# Patient Record
Sex: FEMALE | Race: WHITE | NOT HISPANIC OR LATINO | URBAN - METROPOLITAN AREA
[De-identification: names, ages, dates, MRNs, and addresses within clinical notes are randomized per-mention and may not be internally consistent; named-entity substitution may affect disease eponyms.]

---
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
## 2023-09-07 ENCOUNTER — TELEPHONE (OUTPATIENT)
Dept: ORTHOPEDIC SURGERY | Facility: CLINIC | Age: 18
End: 2023-09-07
Payer: COMMERCIAL

## 2023-09-07 NOTE — TELEPHONE ENCOUNTER
LEFT ANKLE XRAY 9-6. PATIENT HAD PREVIOUS SURGERY ON THE ANKLE-DO NOT HAVE OP NOTE, BUT [facility] NOTES MENTION DR CARRERA, DOES PATIENT NEED TO RETURN THERE OR WILL DR BURCIAGA SEE?

## 2024-03-22 ENCOUNTER — OFFICE VISIT (OUTPATIENT)
Dept: FAMILY MEDICINE CLINIC | Facility: CLINIC | Age: 19
End: 2024-03-22
Payer: COMMERCIAL

## 2024-03-22 VITALS
DIASTOLIC BLOOD PRESSURE: 62 MMHG | OXYGEN SATURATION: 100 % | BODY MASS INDEX: 22.1 KG/M2 | TEMPERATURE: 97.9 F | HEIGHT: 66 IN | SYSTOLIC BLOOD PRESSURE: 102 MMHG | HEART RATE: 101 BPM | WEIGHT: 137.5 LBS

## 2024-03-22 DIAGNOSIS — R55 SYNCOPE, UNSPECIFIED SYNCOPE TYPE: ICD-10-CM

## 2024-03-22 DIAGNOSIS — Z00.00 ANNUAL PHYSICAL EXAM: ICD-10-CM

## 2024-03-22 DIAGNOSIS — N92.0 MENORRHAGIA WITH REGULAR CYCLE: ICD-10-CM

## 2024-03-22 DIAGNOSIS — G47.00 INSOMNIA, UNSPECIFIED TYPE: ICD-10-CM

## 2024-03-22 DIAGNOSIS — Z83.2 FAMILY HISTORY OF BLEEDING DISORDER: ICD-10-CM

## 2024-03-22 DIAGNOSIS — Z00.00 ENCOUNTER FOR MEDICAL EXAMINATION TO ESTABLISH CARE: Primary | ICD-10-CM

## 2024-03-22 LAB
ALBUMIN SERPL-MCNC: 4.9 G/DL (ref 3.5–5.2)
ALBUMIN/GLOB SERPL: 1.8 G/DL
ALP SERPL-CCNC: 54 U/L (ref 39–117)
ALT SERPL W P-5'-P-CCNC: 13 U/L (ref 1–33)
ANION GAP SERPL CALCULATED.3IONS-SCNC: 10.7 MMOL/L (ref 5–15)
AST SERPL-CCNC: 17 U/L (ref 1–32)
BASOPHILS # BLD AUTO: 0.02 10*3/MM3 (ref 0–0.2)
BASOPHILS NFR BLD AUTO: 0.3 % (ref 0–1.5)
BILIRUB SERPL-MCNC: 0.3 MG/DL (ref 0–1.2)
BILIRUB UR QL STRIP: NEGATIVE
BUN SERPL-MCNC: 7 MG/DL (ref 6–20)
BUN/CREAT SERPL: 8 (ref 7–25)
CALCIUM SPEC-SCNC: 9.5 MG/DL (ref 8.6–10.5)
CHLORIDE SERPL-SCNC: 104 MMOL/L (ref 98–107)
CHOLEST SERPL-MCNC: 123 MG/DL (ref 0–200)
CLARITY UR: CLEAR
CO2 SERPL-SCNC: 23.3 MMOL/L (ref 22–29)
COLOR UR: YELLOW
CREAT SERPL-MCNC: 0.88 MG/DL (ref 0.57–1)
DEPRECATED RDW RBC AUTO: 41.8 FL (ref 37–54)
EGFRCR SERPLBLD CKD-EPI 2021: 96 ML/MIN/1.73
EOSINOPHIL # BLD AUTO: 0.05 10*3/MM3 (ref 0–0.4)
EOSINOPHIL NFR BLD AUTO: 0.8 % (ref 0.3–6.2)
ERYTHROCYTE [DISTWIDTH] IN BLOOD BY AUTOMATED COUNT: 12.8 % (ref 12.3–15.4)
FERRITIN SERPL-MCNC: 81.1 NG/ML (ref 13–150)
GLOBULIN UR ELPH-MCNC: 2.8 GM/DL
GLUCOSE SERPL-MCNC: 89 MG/DL (ref 65–99)
GLUCOSE UR STRIP-MCNC: NEGATIVE MG/DL
HCT VFR BLD AUTO: 42 % (ref 34–46.6)
HDLC SERPL-MCNC: 56 MG/DL (ref 40–60)
HGB BLD-MCNC: 13.7 G/DL (ref 12–15.9)
HGB UR QL STRIP.AUTO: NEGATIVE
HOLD SPECIMEN: NORMAL
IMM GRANULOCYTES # BLD AUTO: 0.02 10*3/MM3 (ref 0–0.05)
IMM GRANULOCYTES NFR BLD AUTO: 0.3 % (ref 0–0.5)
IRON 24H UR-MRATE: 30 MCG/DL (ref 37–145)
IRON SATN MFR SERPL: 8 % (ref 20–50)
KETONES UR QL STRIP: NEGATIVE
LDLC SERPL CALC-MCNC: 57 MG/DL (ref 0–100)
LDLC/HDLC SERPL: 1.06 {RATIO}
LEUKOCYTE ESTERASE UR QL STRIP.AUTO: NEGATIVE
LYMPHOCYTES # BLD AUTO: 1.35 10*3/MM3 (ref 0.7–3.1)
LYMPHOCYTES NFR BLD AUTO: 20.8 % (ref 19.6–45.3)
MCH RBC QN AUTO: 29 PG (ref 26.6–33)
MCHC RBC AUTO-ENTMCNC: 32.6 G/DL (ref 31.5–35.7)
MCV RBC AUTO: 88.8 FL (ref 79–97)
MONOCYTES # BLD AUTO: 0.7 10*3/MM3 (ref 0.1–0.9)
MONOCYTES NFR BLD AUTO: 10.8 % (ref 5–12)
NEUTROPHILS NFR BLD AUTO: 4.34 10*3/MM3 (ref 1.7–7)
NEUTROPHILS NFR BLD AUTO: 67 % (ref 42.7–76)
NITRITE UR QL STRIP: NEGATIVE
NRBC BLD AUTO-RTO: 0 /100 WBC (ref 0–0.2)
PH UR STRIP.AUTO: 6.5 [PH] (ref 5–8)
PLATELET # BLD AUTO: 269 10*3/MM3 (ref 140–450)
PMV BLD AUTO: 11.3 FL (ref 6–12)
POTASSIUM SERPL-SCNC: 3.8 MMOL/L (ref 3.5–5.2)
PROT SERPL-MCNC: 7.7 G/DL (ref 6–8.5)
PROT UR QL STRIP: NEGATIVE
RBC # BLD AUTO: 4.73 10*6/MM3 (ref 3.77–5.28)
SODIUM SERPL-SCNC: 138 MMOL/L (ref 136–145)
SP GR UR STRIP: 1.01 (ref 1–1.03)
TIBC SERPL-MCNC: 387 MCG/DL (ref 298–536)
TRANSFERRIN SERPL-MCNC: 260 MG/DL (ref 200–360)
TRIGL SERPL-MCNC: 37 MG/DL (ref 0–150)
TSH SERPL DL<=0.05 MIU/L-ACNC: 0.79 UIU/ML (ref 0.27–4.2)
UROBILINOGEN UR QL STRIP: NORMAL
VLDLC SERPL-MCNC: 10 MG/DL (ref 5–40)
WBC NRBC COR # BLD AUTO: 6.48 10*3/MM3 (ref 3.4–10.8)

## 2024-03-22 PROCEDURE — 80061 LIPID PANEL: CPT

## 2024-03-22 PROCEDURE — 84466 ASSAY OF TRANSFERRIN: CPT

## 2024-03-22 PROCEDURE — 82728 ASSAY OF FERRITIN: CPT

## 2024-03-22 PROCEDURE — 83540 ASSAY OF IRON: CPT

## 2024-03-22 PROCEDURE — 80050 GENERAL HEALTH PANEL: CPT

## 2024-03-22 PROCEDURE — 81003 URINALYSIS AUTO W/O SCOPE: CPT

## 2024-03-22 RX ORDER — HYDROXYZINE HYDROCHLORIDE 25 MG/1
25 TABLET, FILM COATED ORAL NIGHTLY PRN
Qty: 30 TABLET | Refills: 0 | Status: SHIPPED | OUTPATIENT
Start: 2024-03-22

## 2024-03-22 NOTE — PROGRESS NOTES
Chief Complaint  Chief Complaint   Patient presents with    Dizziness    Establish Care       Subjective      Hattie Garrett presents to [facility]  The patient is a 19-year-old female who comes in today as a new patient to establish care and have annual physical exam.    Establish care.  It has been a while since she has seen a provider. She went to the [facility] for a year and went to some on campus physicians a couple of times, but since then she has not seen a doctor.     Dizziness.  Since her katelyn year in high school, she has been experiencing dizziness. Reports being evaluated with blood work and MRI with contrast, which were normal. No further investigation was done. The episodes are intermittent and when they occur it is constant. She tried to see if it was diet associated or hydration. Denies pain associated with it. Reports that when she goes from sitting to standing, she becomes dizzy, and feels like she is going to pass out. It is worse in the morning. The episodes occur is she stands in place for a long time, she becomes dizzy. Occasionally, in the shower she must sit.  She has done her own google research and thinks it could be POTS or Adrian-Danlos syndrome. Lately, she has been drinking mostly water with some sweet drinks. She stopped drinking coffee and energy drinks as they made her shaky. She had one episode of syncope, where she became dizzy, lost consciousness, and hit her head, but this was not recent. She does not get dizzy associated with nerves, emotions, or upset.  Denies palpitations.    Menstrual cycle.   Her menstrual cycles are heavy. First menses onset at age 12. Expresses 2 years ago is when they became heavy. Now, she gets cramps and PMS. Her cycles are regular. The week prior to her menstrual cycle she is dizzy. Endorse being evaluated for anemia and iron deficiency twice in 2021, which were normal.    Insomnia.  She has a hard  "time falling asleep at night. Lately, she has been fatigued throughout the day. She cannot get through the day without a nap. Waking up in the morning is the \"worst.\" She wakes up a lot throughout the night. She is anxious, worried, nervous, and cannot shut her mind off. Expresses she has tried melatonin 5 mg, which works sometimes, but sometimes it does not. Benadryl makes her groggy.    Gynecological care.  She has not had a Pap smear.    Surgical history.  She has had 2 wisdom teeth removed. She is not on any medications.     Medical history.  She has never had to take medicine for thyroid, diabetes mellitus, depression, or anxiety    Social history.  The patient denies smoking. She drinks alcohol socially. 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    Family history.  She has a family history of factor V and bleeding disorders. Her mother had miscarriages. Her mother is having her thyroid removed.    Objective     Medical History:  History reviewed. No pertinent past medical history.  Past Surgical History:   Procedure Laterality Date    ANKLE SURGERY Left     WISDOM TOOTH EXTRACTION        Social History     Tobacco Use    Smoking status: Never     Passive exposure: Never    Smokeless tobacco: Never   Vaping Use    Vaping status: Never Used   Substance Use Topics    Alcohol use: Never    Drug use: Never     History reviewed. No pertinent family history.    Medications:  Prior to Admission medications    Not on File        Allergies:   Patient has no known allergies.    Health Maintenance Due   Topic Date Due    HPV VACCINES (1 - 3-dose series) Never done    INFLUENZA VACCINE  08/01/2023    COVID-19 Vaccine (3 - 2023-24 season) 09/01/2023    HEPATITIS C SCREENING  Never done    ANNUAL PHYSICAL  Never done    PAP SMEAR  Never done         Vital Signs:   /62   Pulse 101   Temp 97.9 °F (36.6 °C)   Ht 167.6 cm (66\")   Wt 62.4 kg (137 lb 8 oz)   SpO2 100%   BMI 22.19 kg/m²     Wt Readings " from Last 3 Encounters:   03/22/24 62.4 kg (137 lb 8 oz)   02/20/23 62.1 kg (137 lb) (65%, Z= 0.37)*   08/27/21 61.2 kg (135 lb) (67%, Z= 0.44)*     * Growth percentiles are based on [facility] (Girls, 2-20 Years) data.     BP Readings from Last 3 Encounters:   03/22/24 102/62   09/06/23 138/91   02/20/23 123/70       BMI is within normal parameters. No other follow-up for BMI required.       Physical Exam  Vitals reviewed.   Constitutional:       Appearance: Normal appearance. She is well-developed.   HENT:      Head: Normocephalic and atraumatic.   Eyes:      Conjunctiva/sclera: Conjunctivae normal.      Pupils: Pupils are equal, round, and reactive to light.   Cardiovascular:      Rate and Rhythm: Normal rate and regular rhythm.      Heart sounds: No murmur heard.     No friction rub. No gallop.   Pulmonary:      Effort: Pulmonary effort is normal.      Breath sounds: Normal breath sounds. No wheezing or rhonchi.   Abdominal:      General: Bowel sounds are normal. There is no distension.      Palpations: Abdomen is soft.      Tenderness: There is no abdominal tenderness.   Skin:     General: Skin is warm and dry.   Neurological:      Mental Status: She is alert and oriented to person, place, and time.      Cranial Nerves: No cranial nerve deficit.   Psychiatric:         Mood and Affect: Mood and affect normal.         Behavior: Behavior normal.         Thought Content: Thought content normal.         Judgment: Judgment normal.         Result Review :    The following data was reviewed by IMAN South on 03/22/24 at 18:41 EDT:        No Images in the past 120 days found..               Assessment and Plan    Diagnoses and all orders for this visit:    1. Encounter for medical examination to establish care (Primary)    2. Annual physical exam  -     CBC & Differential  -     Comprehensive Metabolic Panel  -     Lipid Panel  -     TSH Rfx On Abnormal To Free T4  -     Urinalysis With Culture If Indicated -  Urine, Clean Catch    3. Syncope, unspecified syncope type  -     CBC & Differential  -     Comprehensive Metabolic Panel  -     Iron Profile  -     Ferritin  -     Urinalysis With Culture If Indicated - Urine, Clean Catch    4. Family history of bleeding disorder  -     Factor 5 Leiden    5. Menorrhagia with regular cycle  -     Iron Profile  -     Ferritin    6. Insomnia, unspecified type  -     hydrOXYzine (ATARAX) 25 MG tablet; Take 1 tablet by mouth At Night As Needed for Anxiety.  Dispense: 30 tablet; Refill: 0       1. Dizziness and passing out.  It could be due to dehydration or iron deficiency. I will check blood counts, cholesterol panel, thyroid panel, kidney and liver function, electrolytes, iron profile, and factor V. If something comes back abnormal, I will refer her to a hematologist.    2. Insomnia.  The intense fatigue could be related to anemia and iron deficiency. I will start her on hydroxyzine 1 tablet as needed. She can try melatonin 10 mg or 20 mg occasionally. If she gets improved sleep nightly, daytime fatigue may be improved.     3. Annual physical exam.  Pap smear is due, discussed with patient. She will consider the HPV vaccine.    Follow-up  Patient will follow up with me in 1 month to discuss lab results and treatment plan, any new medications she may be started on. If no abnormality identified, she will be referred to cardiology to rule out cardiac involvement.         Smoking Cessation:    Hattie Garrett  reports that she has never smoked. She has never been exposed to tobacco smoke. She has never used smokeless tobacco.         Follow Up   Return in about 1 month (around 4/22/2024) for Next scheduled follow up.  Patient was given instructions and counseling regarding her condition or for health maintenance advice. Please see specific information pulled into the AVS if appropriate.     Please note that portions of this note were completed with a voice recognition  program.    IMAN [facility]  Transcribed from ambient dictation for [facility] by Marifer Banks.  03/22/24   13:45 EDT    Patient or patient representative verbalized consent to the visit recording.  I have personally performed the services described in this document as transcribed by the above individual, and it is both accurate and complete.

## 2024-03-25 LAB — F5 GENE MUT ANL BLD/T: NORMAL

## 2024-03-26 DIAGNOSIS — E61.1 IRON DEFICIENCY: Primary | ICD-10-CM

## 2024-03-26 RX ORDER — IRON POLYSACCH/IRON HEME POLYP 28 MG
1 TABLET ORAL DAILY
Qty: 90 TABLET | Refills: 1 | Status: SHIPPED | OUTPATIENT
Start: 2024-03-26

## 2024-04-24 ENCOUNTER — OFFICE VISIT (OUTPATIENT)
Dept: FAMILY MEDICINE CLINIC | Facility: CLINIC | Age: 19
End: 2024-04-24
Payer: COMMERCIAL

## 2024-04-24 VITALS
BODY MASS INDEX: 22.56 KG/M2 | HEIGHT: 66 IN | WEIGHT: 140.4 LBS | SYSTOLIC BLOOD PRESSURE: 102 MMHG | OXYGEN SATURATION: 100 % | TEMPERATURE: 97.5 F | HEART RATE: 78 BPM | DIASTOLIC BLOOD PRESSURE: 66 MMHG

## 2024-04-24 DIAGNOSIS — R23.8 SKIN SENSITIVITY: ICD-10-CM

## 2024-04-24 DIAGNOSIS — G47.00 INSOMNIA, UNSPECIFIED TYPE: ICD-10-CM

## 2024-04-24 DIAGNOSIS — E61.1 IRON DEFICIENCY: Primary | ICD-10-CM

## 2024-04-24 DIAGNOSIS — L70.0 ACNE VULGARIS: ICD-10-CM

## 2024-04-24 PROCEDURE — 99214 OFFICE O/P EST MOD 30 MIN: CPT

## 2024-04-24 RX ORDER — TRAZODONE HYDROCHLORIDE 50 MG/1
50 TABLET ORAL NIGHTLY
Qty: 30 TABLET | Refills: 2 | Status: SHIPPED | OUTPATIENT
Start: 2024-04-24

## 2024-04-24 NOTE — PROGRESS NOTES
Chief Complaint  Chief Complaint   Patient presents with    Insomnia    Follow-up    Rash       Subjective      Hattie Garrett presents to [facility]  History of Present Illness  The patient presents to follow-up on insomnia, iron deficiency.    The patient reports experiencing insomnia, characterized by difficulty initiating sleep and frequent awakenings. Despite an early sleep onset, her sleep quality remains suboptimal. Hydroxyzine, taken once daily for several consecutive nights, did not yield any improvement. She abstained from caffeine during this period. Benadryl, taken sparingly, resulted in morning grogginess. Over-the-counter sleep aids have been trialed, including nightly melatonin intake for a duration of 3 weeks.    The patient is attempting to adhere to her daily iron regimen due to heavy menstrual cycles. She denies experiencing any gastrointestinal upset or constipation. She has never been prescribed birth control and is contemplating its use if deemed strongly recommended. A Pap smear has been scheduled for her with a gynecologist. During her high school years, she was supplementing with iron and L-theanine for focus. However, she experienced an episode of vomiting at school, leading her to question the possible interaction or contraindications of other medications while taking iron supplementation.    On 03/09/2024, the patient developed a non-raised rash that fluctuates in severity, characterized by itching. The rash typically manifests at night post-shower and washing her face. She has attempted to manage the rash with Aquaphor, which provided temporary relief, but the rash continues to reappear. She has not identified any correlation between the rash and her mood or feelings of anxiety.    The patient is scheduled for a dermatology appointment on 05/08/2024 for her acne and is considering spironolactone. Accutane has been suggested as an alternative  "treatment option. Tretinoin has been trialed, which provided temporary relief for a few months before recurring skin breakouts.  She has been using curology.      Objective     Medical History:  History reviewed. No pertinent past medical history.  Past Surgical History:   Procedure Laterality Date    ANKLE SURGERY Left     WISDOM TOOTH EXTRACTION        Social History     Tobacco Use    Smoking status: Never     Passive exposure: Never    Smokeless tobacco: Never   Vaping Use    Vaping status: Never Used   Substance Use Topics    Alcohol use: Never    Drug use: Never     History reviewed. No pertinent family history.    Medications:  Prior to Admission medications    Medication Sig Start Date End Date Taking? Authorizing Provider   hydrOXYzine (ATARAX) 25 MG tablet Take 1 tablet by mouth At Night As Needed for Anxiety.  Patient not taking: Reported on 4/24/2024 3/22/24   Aylin Tavares APRN   Polysacch Fe Cmp-Fe Heme Poly (Feosol Bifera) 28 MG tablet Take 1 tablet by mouth Daily.  Patient not taking: Reported on 4/24/2024 3/26/24   Aylin Tavares APRN        Allergies:   Patient has no known allergies.    Health Maintenance Due   Topic Date Due    HPV VACCINES (1 - 3-dose series) Never done    COVID-19 Vaccine (3 - 2023-24 season) 09/01/2023    HEPATITIS C SCREENING  Never done    PAP SMEAR  Never done         Vital Signs:   /66   Pulse 78   Temp 97.5 °F (36.4 °C)   Ht 167.6 cm (66\")   Wt 63.7 kg (140 lb 6.4 oz)   SpO2 100%   BMI 22.66 kg/m²     Wt Readings from Last 3 Encounters:   04/24/24 63.7 kg (140 lb 6.4 oz)   03/22/24 62.4 kg (137 lb 8 oz)   02/20/23 62.1 kg (137 lb) (65%, Z= 0.37)*     * Growth percentiles are based on CDC (Girls, 2-20 Years) data.     BP Readings from Last 3 Encounters:   04/24/24 102/66   03/22/24 102/62   09/06/23 138/91       BMI is within normal parameters. No other follow-up for BMI required.       Physical Exam  Vitals reviewed.   Constitutional:       " Appearance: Normal appearance. She is well-developed.   HENT:      Head: Normocephalic and atraumatic.   Eyes:      Conjunctiva/sclera: Conjunctivae normal.      Pupils: Pupils are equal, round, and reactive to light.   Cardiovascular:      Rate and Rhythm: Normal rate and regular rhythm.      Heart sounds: No murmur heard.     No friction rub. No gallop.   Pulmonary:      Effort: Pulmonary effort is normal.      Breath sounds: Normal breath sounds. No wheezing or rhonchi.   Abdominal:      General: Bowel sounds are normal. There is no distension.      Palpations: Abdomen is soft.      Tenderness: There is no abdominal tenderness.   Skin:     General: Skin is warm and dry.      Findings: Acne present.      Comments: Facial acne noted   Neurological:      Mental Status: She is alert and oriented to person, place, and time.      Cranial Nerves: No cranial nerve deficit.   Psychiatric:         Mood and Affect: Mood and affect normal.         Behavior: Behavior normal.         Thought Content: Thought content normal.         Judgment: Judgment normal.       Physical Exam        Result Review :    The following data was reviewed by IMAN South on 04/24/24 at 10:41 EDT:    Common labs          3/22/2024    13:54   Common Labs   Glucose 89    BUN 7    Creatinine 0.88    Sodium 138    Potassium 3.8    Chloride 104    Calcium 9.5    Albumin 4.9    Total Bilirubin 0.3    Alkaline Phosphatase 54    AST (SGOT) 17    ALT (SGPT) 13    WBC 6.48    Hemoglobin 13.7    Hematocrit 42.0    Platelets 269    Total Cholesterol 123    Triglycerides 37    HDL Cholesterol 56    LDL Cholesterol  57        No Images in the past 120 days found..    Results  Laboratory Studies  Iron was low.               Assessment and Plan    Diagnoses and all orders for this visit:    1. Iron deficiency (Primary)    2. Insomnia, unspecified type  -     traZODone (DESYREL) 50 MG tablet; Take 1 tablet by mouth Every Night.  Dispense: 30 tablet;  Refill: 2    3. Acne vulgaris    4. Skin sensitivity       Assessment & Plan  1. Insomnia.  The patient's sleep quality is suboptimal. A prescription for trazodone 50 mg, to be taken as needed, has been issued. The patient is advised to initially take 1 tablet at night, allowing for 3 to 4 doses before discontinuation. If this proves effective, the dosage can be maintained at 1 tablet. If ineffective, the dosage can be increased to 2 tablets. If trazodone proves effective, it can be taken regularly.    2. Iron deficiency.  The patient's iron levels are low. The patient is advised to continue over-the-counter iron supplements. Potential side effects, including nausea, constipation, and dark-colored stools, were discussed. If constipation is severe, an over-the-counter laxative is recommended. The patient was educated about the potential impact of milk products on absorption and advised to take a vitamin C supplement for improved absorption. Should the patient's menstrual cycles continue to be persistently heavy with low iron, a low hormonal contraceptive may be considered.  Patient declines initiating hormonal contraceptive at this time.    3. Rash.  The rash is currently not severe. The patient's sensitivity to facial products may be too harsh on her neck. The patient is advised to continue using Aquaphor or Cetaphil.  Monitor recurrence of the rash to determine if it is correlated with her mood or anxiety.    4. Acne.  The patient was informed that spironolactone is beneficial for hormonal acne in females. It was also discussed that hormonal contraceptives, specifically Raisa, can assist with acne. The patient was advised to discuss this with dermatology as she has an upcoming appointment.    Follow-up  The patient is advised to follow-up in 3 months, or sooner if necessary.  At that time we will continue monitoring iron levels and continued indication for daily iron supplement.  We will also discuss acne treatment  and menorrhagia.            Follow Up   Return in about 3 months (around 7/24/2024) for Next scheduled follow up.  Patient was given instructions and counseling regarding her condition or for health maintenance advice. Please see specific information pulled into the AVS if appropriate.     Please note that portions of this note were completed with a voice recognition program.    Patient or patient representative verbalized consent for the use of Ambient Listening during the visit with  IMAN [facility] for chart documentation. 4/24/2024  10:18 EDT

## 2024-07-24 ENCOUNTER — OFFICE VISIT (OUTPATIENT)
Dept: FAMILY MEDICINE CLINIC | Facility: CLINIC | Age: 19
End: 2024-07-24
Payer: COMMERCIAL

## 2024-07-24 VITALS
DIASTOLIC BLOOD PRESSURE: 70 MMHG | OXYGEN SATURATION: 99 % | HEART RATE: 97 BPM | SYSTOLIC BLOOD PRESSURE: 102 MMHG | BODY MASS INDEX: 22.35 KG/M2 | WEIGHT: 139.1 LBS | HEIGHT: 66 IN | TEMPERATURE: 98.5 F

## 2024-07-24 DIAGNOSIS — M24.9 HYPERMOBILITY OF JOINT: ICD-10-CM

## 2024-07-24 DIAGNOSIS — M25.30 JOINT INSTABILITY: ICD-10-CM

## 2024-07-24 DIAGNOSIS — E61.1 IRON DEFICIENCY: Primary | ICD-10-CM

## 2024-07-24 DIAGNOSIS — N92.0 MENORRHAGIA WITH REGULAR CYCLE: ICD-10-CM

## 2024-07-24 DIAGNOSIS — G47.00 INSOMNIA, UNSPECIFIED TYPE: ICD-10-CM

## 2024-07-24 PROCEDURE — 99214 OFFICE O/P EST MOD 30 MIN: CPT

## 2024-07-24 RX ORDER — SPIRONOLACTONE 50 MG/1
50 TABLET, FILM COATED ORAL
COMMUNITY
Start: 2024-07-19

## 2024-07-24 RX ORDER — CLINDAMYCIN PHOSPHATE 10 UG/ML
LOTION TOPICAL
COMMUNITY
Start: 2024-05-08

## 2024-07-24 NOTE — PROGRESS NOTES
Chief Complaint  Chief Complaint   Patient presents with    Anemia    Dizziness    Follow-up       Subjective      Hattie Garrett presents to [facility]  History of Present Illness  The patient presents for evaluation of multiple medical concerns.    She has been taking over-the-counter iron supplements and has been taking spironolactone 50 mg once daily at night for acne for the past 2 months. Her menstrual cycle is regular and she has noticed a slight lightening since starting spironolactone. She occasionally experiences dizziness, particularly when standing for extended periods. This dizziness subsides when she is active. She fainted in the shower two nights ago. She finds relief from her dizziness by consuming salt and Gatorade.    She is concerned about the possibility of Adrian-Danlos syndrome. She has a history of multiple broken bones and injuries due to severe ankle problems. She has undergone consistent physical therapy and had surgery on her ATFL. Last summer, she sprained her right ankle while jumping, which caused pain for a few weeks. She also had surgery on her left ankle, which resulted in a second fracture. Her knees are hypermobile.    She takes trazodone, which sometimes helps, but it causes severe restless legs. It takes her longer to fall asleep, but she feels her sleep has improved. She took 2 trazodone once, but it made her groggy the next morning. Hydroxyzine and melatonin did not provide much relief. She used to take melatonin frequently, but it did not provide much relief. She is considering taking methylated multivitamins. She is considering using an Apple watch or The Ora Ring to track her sleep.   Her mother has MTHFR mutation.      Objective     Medical History:  History reviewed. No pertinent past medical history.  Past Surgical History:   Procedure Laterality Date    ANKLE SURGERY Left     WISDOM TOOTH EXTRACTION        Social History  "    Tobacco Use    Smoking status: Never     Passive exposure: Never    Smokeless tobacco: Never   Vaping Use    Vaping status: Never Used   Substance Use Topics    Alcohol use: Never    Drug use: Never     History reviewed. No pertinent family history.    Medications:  Prior to Admission medications    Medication Sig Start Date End Date Taking? Authorizing Provider   clindamycin (CLEOCIN T) 1 % lotion APPLY THIN LAYER TOPICALLY TO FACE EVERY MORNING 5/8/24  Yes ProviderFrancois MD   IRON, FERROUS SULFATE, PO Take  by mouth.   Yes ProviderFrancois MD   spironolactone (ALDACTONE) 50 MG tablet Take 1 tablet by mouth every night at bedtime. 7/19/24  Yes Francois Goddard MD   traZODone (DESYREL) 50 MG tablet Take 1 tablet by mouth Every Night. 4/24/24  Yes Aylin Tavares APRN   tretinoin (RETIN-A) 0.025 % cream APPLY A PEA SIZED AMOUNT TO THE FACE EVERY 3RD NIGHT INCREASING TO NIGHTLY AS TOLERATED 7/19/24  Yes Provider, MD Francois        Allergies:   Patient has no known allergies.    Health Maintenance Due   Topic Date Due    HPV VACCINES (1 - 3-dose series) Never done    COVID-19 Vaccine (3 - 2023-24 season) 09/01/2023    HEPATITIS C SCREENING  Never done    PAP SMEAR  Never done    TDAP/TD VACCINES (2 - Td or Tdap) 04/29/2024         Vital Signs:   /70   Pulse 97   Temp 98.5 °F (36.9 °C)   Ht 167.6 cm (66\")   Wt 63.1 kg (139 lb 1.6 oz)   SpO2 99%   BMI 22.45 kg/m²     Wt Readings from Last 3 Encounters:   07/24/24 63.1 kg (139 lb 1.6 oz)   04/24/24 63.7 kg (140 lb 6.4 oz)   03/22/24 62.4 kg (137 lb 8 oz)     BP Readings from Last 3 Encounters:   07/24/24 102/70   04/24/24 102/66   03/22/24 102/62       BMI is within normal parameters. No other follow-up for BMI required.       Physical Exam  Vitals reviewed.   Constitutional:       Appearance: Normal appearance. She is well-developed.   HENT:      Head: Normocephalic and atraumatic.   Eyes:      Conjunctiva/sclera: Conjunctivae " normal.      Pupils: Pupils are equal, round, and reactive to light.   Cardiovascular:      Rate and Rhythm: Normal rate and regular rhythm.      Heart sounds: No murmur heard.     No friction rub. No gallop.   Pulmonary:      Effort: Pulmonary effort is normal.      Breath sounds: Normal breath sounds. No wheezing or rhonchi.   Abdominal:      General: Bowel sounds are normal. There is no distension.      Palpations: Abdomen is soft.      Tenderness: There is no abdominal tenderness.   Skin:     General: Skin is warm and dry.   Neurological:      Mental Status: She is alert and oriented to person, place, and time.      Cranial Nerves: No cranial nerve deficit.   Psychiatric:         Mood and Affect: Mood and affect normal.         Behavior: Behavior normal.         Thought Content: Thought content normal.         Judgment: Judgment normal.       Physical Exam        Result Review :    The following data was reviewed by IMAN South on 07/24/24 at 12:31 EDT:    Common labs          3/22/2024    13:54   Common Labs   Glucose 89    BUN 7    Creatinine 0.88    Sodium 138    Potassium 3.8    Chloride 104    Calcium 9.5    Albumin 4.9    Total Bilirubin 0.3    Alkaline Phosphatase 54    AST (SGOT) 17    ALT (SGPT) 13    WBC 6.48    Hemoglobin 13.7    Hematocrit 42.0    Platelets 269    Total Cholesterol 123    Triglycerides 37    HDL Cholesterol 56    LDL Cholesterol  57        No Images in the past 120 days found..    Results                 Assessment and Plan    Diagnoses and all orders for this visit:    1. Iron deficiency (Primary)    2. Insomnia, unspecified type    3. Menorrhagia with regular cycle    4. Joint instability  -     Ambulatory Referral to  (External)    5. Hypermobility of joint  -     Ambulatory Referral to  (External)       Assessment & Plan  1. Dizziness.  Spironolactone may be contributing to her dizziness. She was advised to increase her water intake.    2.  Hypermobility/frequent injuries.  There is no cure for Adrian-Danlos syndrome, but it would be beneficial to ascertain if she has it. A referral to a specialist will be made.    3. Insomnia.  She was advised to take 50 mg of trazodone and 10 mg of melatonin to aid sleep. Magnesium glycinate was also suggested.    4. Acne.  She was advised to continue spironolactone to observe any changes.    5. Iron deficiency.  Her labs and iron levels will be rechecked at her next visit.  Continue over-the-counter iron supplement.  If periods continue to lighten, iron deficiency may resolved.    Follow-up  A follow-up visit is scheduled in 3 months for labs to monitor iron deficiency anemia.          Smoking Cessation:    Hattie Garrett  reports that she has never smoked. She has never been exposed to tobacco smoke. She has never used smokeless tobacco.             Follow Up   Return in about 3 months (around 10/24/2024) for Next scheduled follow up.  Patient was given instructions and counseling regarding her condition or for health maintenance advice. Please see specific information pulled into the AVS if appropriate.     Please note that portions of this note were completed with a voice recognition program.    Patient or patient representative verbalized consent for the use of Ambient Listening during the visit with  IMAN South for chart documentation. 7/24/2024  11:38 EDT

## 2024-07-30 DIAGNOSIS — M24.9 HYPERMOBILITY OF JOINT: ICD-10-CM

## 2024-07-30 DIAGNOSIS — M25.30 JOINT INSTABILITY: Primary | ICD-10-CM

## 2024-09-12 DIAGNOSIS — R55 SYNCOPE, UNSPECIFIED SYNCOPE TYPE: Primary | ICD-10-CM

## 2024-09-12 DIAGNOSIS — M24.9 HYPERMOBILITY OF JOINT: ICD-10-CM

## 2024-09-12 DIAGNOSIS — M25.30 JOINT INSTABILITY: ICD-10-CM

## 2025-02-07 ENCOUNTER — TELEPHONE (OUTPATIENT)
Dept: FAMILY MEDICINE CLINIC | Facility: CLINIC | Age: 20
End: 2025-02-07
Payer: COMMERCIAL

## 2025-02-07 NOTE — TELEPHONE ENCOUNTER
Left voicemail for patient to call back. Left voicemail that patient can go back to work once she is feeling well with no fever. She can wear a mask for a few days as well.